# Patient Record
Sex: MALE | Race: WHITE | NOT HISPANIC OR LATINO | Employment: FULL TIME | URBAN - METROPOLITAN AREA
[De-identification: names, ages, dates, MRNs, and addresses within clinical notes are randomized per-mention and may not be internally consistent; named-entity substitution may affect disease eponyms.]

---

## 2017-12-07 ENCOUNTER — OFFICE VISIT (OUTPATIENT)
Dept: FAMILY MEDICINE CLINIC | Facility: CLINIC | Age: 42
End: 2017-12-07

## 2017-12-07 VITALS
WEIGHT: 154 LBS | OXYGEN SATURATION: 99 % | HEIGHT: 69 IN | BODY MASS INDEX: 22.81 KG/M2 | DIASTOLIC BLOOD PRESSURE: 82 MMHG | HEART RATE: 55 BPM | SYSTOLIC BLOOD PRESSURE: 130 MMHG

## 2017-12-07 DIAGNOSIS — F51.01 PRIMARY INSOMNIA: ICD-10-CM

## 2017-12-07 DIAGNOSIS — F41.9 ANXIETY: Primary | ICD-10-CM

## 2017-12-07 PROCEDURE — 99202 OFFICE O/P NEW SF 15 MIN: CPT | Performed by: NURSE PRACTITIONER

## 2017-12-07 RX ORDER — TRAZODONE HYDROCHLORIDE 50 MG/1
TABLET ORAL
Refills: 0 | COMMUNITY
Start: 2017-11-27

## 2017-12-07 RX ORDER — METHOCARBAMOL 500 MG/1
500 TABLET, FILM COATED ORAL
COMMUNITY
Start: 2016-03-05

## 2017-12-07 NOTE — PROGRESS NOTES
"Gilles Black is a 42 y.o. male. New pt to me that presents wanting trazodone refilled for insomnia. Pt reports periods of \"not sleeping well for many years.\"  Pt denies sleep apnea symptoms and denies a mental health history. In review of pt's chart he has a hx of alcohol abuse and he admits he drinks on the wkds. Pt has a trazodone bottle from the [facility]. He is also seeing a chiropractor for ongoing neck pain. Pt is not interested in routine healthcare.     NO CONTROLLED SUBSTANCES FROM OUR OFFICE  Assessment/Plan   Problem List Items Addressed This Visit     None      Visit Diagnoses     Anxiety    -  Primary    Relevant Orders    Ambulatory Referral to Psychiatry    Primary insomnia        Relevant Orders    Ambulatory Referral to Psychiatry             Return for Annual.  Patient Instructions   Insomnia  Insomnia is a sleep disorder that makes it difficult to fall asleep or to stay asleep. Insomnia can cause tiredness (fatigue), low energy, difficulty concentrating, mood swings, and poor performance at work or school.   There are three different ways to classify insomnia:   · Difficulty falling asleep.  · Difficulty staying asleep.  · Waking up too early in the morning.  Any type of insomnia can be long-term (chronic) or short-term (acute). Both are common. Short-term insomnia usually lasts for three months or less. Chronic insomnia occurs at least three times a week for longer than three months.  CAUSES   Insomnia may be caused by another condition, situation, or substance, such as:  · Anxiety.  · Certain medicines.  · Gastroesophageal reflux disease (GERD) or other gastrointestinal conditions.  · Asthma or other breathing conditions.  · Restless legs syndrome, sleep apnea, or other sleep disorders.  · Chronic pain.  · Menopause. This may include hot flashes.  · Stroke.  · Abuse of alcohol, tobacco, or illegal drugs.  · Depression.  · Caffeine.    · Neurological disorders, such as Alzheimer disease.  · An " overactive thyroid (hyperthyroidism).  The cause of insomnia may not be known.  RISK FACTORS  Risk factors for insomnia include:  · Gender. Women are more commonly affected than men.  · Age. Insomnia is more common as you get older.  · Stress. This may involve your professional or personal life.  · Income. Insomnia is more common in people with lower income.  · Lack of exercise.    · Irregular work schedule or night shifts.  · Traveling between different time zones.  SIGNS AND SYMPTOMS  If you have insomnia, trouble falling asleep or trouble staying asleep is the main symptom. This may lead to other symptoms, such as:  · Feeling fatigued.  · Feeling nervous about going to sleep.  · Not feeling rested in the morning.  · Having trouble concentrating.  · Feeling irritable, anxious, or depressed.  TREATMENT   Treatment for insomnia depends on the cause. If your insomnia is caused by an underlying condition, treatment will focus on addressing the condition. Treatment may also include:   · Medicines to help you sleep.  · Counseling or therapy.  · Lifestyle adjustments.  HOME CARE INSTRUCTIONS   · Take medicines only as directed by your health care provider.  · Keep regular sleeping and waking hours. Avoid naps.  · Keep a sleep diary to help you and your health care provider figure out what could be causing your insomnia. Include:      When you sleep.    When you wake up during the night.    How well you sleep.      How rested you feel the next day.    Any side effects of medicines you are taking.    What you eat and drink.    · Make your bedroom a comfortable place where it is easy to fall asleep:    Put up shades or special blackout curtains to block light from outside.    Use a white noise machine to block noise.    Keep the temperature cool.    · Exercise regularly as directed by your health care provider. Avoid exercising right before bedtime.  · Use relaxation techniques to manage stress. Ask your health care  provider to suggest some techniques that may work well for you. These may include:    Breathing exercises.    Routines to release muscle tension.    Visualizing peaceful scenes.  · Cut back on alcohol, caffeinated beverages, and cigarettes, especially close to bedtime. These can disrupt your sleep.  · Do not overeat or eat spicy foods right before bedtime. This can lead to digestive discomfort that can make it hard for you to sleep.  · Limit screen use before bedtime. This includes:    Watching TV.    Using your smartphone, tablet, and computer.  · Stick to a routine. This can help you fall asleep faster. Try to do a quiet activity, brush your teeth, and go to bed at the same time each night.  · Get out of bed if you are still awake after 15 minutes of trying to sleep. Keep the lights down, but try reading or doing a quiet activity. When you feel sleepy, go back to bed.  · Make sure that you drive carefully. Avoid driving if you feel very sleepy.  · Keep all follow-up appointments as directed by your health care provider. This is important.  SEEK MEDICAL CARE IF:   · You are tired throughout the day or have trouble in your daily routine due to sleepiness.  · You continue to have sleep problems or your sleep problems get worse.  SEEK IMMEDIATE MEDICAL CARE IF:   · You have serious thoughts about hurting yourself or someone else.     This information is not intended to replace advice given to you by your health care provider. Make sure you discuss any questions you have with your health care provider.     Document Released: 12/15/2001 Document Revised: 09/07/2016 Document Reviewed: 09/18/2015  [employer] Interactive Patient Education ©2017 [employer]        Chief Complaint   Patient presents with   • NP-establish care   • Insomnia     Social History   Substance Use Topics   • Smoking status: Never Smoker   • Smokeless tobacco: Never Used   • Alcohol use None       History of Present Illness     The following portions  "of the patient's history were reviewed and updated as appropriate:PMHroutine: Social history , Allergies, Current Medications, Active Problem List and Health Maintenance    Review of Systems   Constitutional: Negative for activity change, appetite change, chills, fatigue, fever and unexpected weight change.   HENT: Negative for congestion, ear pain, hearing loss, mouth sores, nosebleeds, rhinorrhea and sore throat.    Eyes: Negative for pain and visual disturbance.   Respiratory: Negative for cough, shortness of breath and wheezing.    Cardiovascular: Negative for chest pain, palpitations and leg swelling.   Gastrointestinal: Negative for abdominal distention, abdominal pain, blood in stool, constipation, diarrhea, nausea and vomiting.   Endocrine: Negative for cold intolerance and heat intolerance.   Genitourinary: Negative for difficulty urinating, discharge, dysuria, frequency, hematuria and urgency.   Musculoskeletal: Positive for back pain, myalgias and neck pain. Negative for joint swelling.   Skin: Negative for rash and wound.   Neurological: Negative for dizziness, weakness, numbness and headaches.   Hematological: Does not bruise/bleed easily.   Psychiatric/Behavioral: Positive for agitation. Negative for confusion, dysphoric mood, sleep disturbance and suicidal ideas. The patient is nervous/anxious.        Objective   Vitals:    12/07/17 1304   BP: 130/82   Pulse: 55   SpO2: 99%   Weight: 69.9 kg (154 lb)   Height: 175.3 cm (69.02\")     Body mass index is 22.73 kg/(m^2).  Physical Exam   Constitutional: He appears well-developed and well-nourished.   HENT:   Head: Normocephalic.   Eyes: EOM are normal. Pupils are equal, round, and reactive to light.   Neck: Normal range of motion.   Pulmonary/Chest: Effort normal.   Nursing note and vitals reviewed.    Reviewed Data:  No results found for any previous visit.      "

## 2023-04-03 RX ORDER — ONDANSETRON HYDROCHLORIDE 8 MG/1
TABLET, FILM COATED ORAL
Qty: 15 TABLET | Refills: 0 | Status: SHIPPED | OUTPATIENT
Start: 2023-04-03

## 2023-05-30 ENCOUNTER — OFFICE VISIT (OUTPATIENT)
Dept: FAMILY MEDICINE CLINIC | Facility: CLINIC | Age: 48
End: 2023-05-30

## 2023-05-30 VITALS
HEART RATE: 53 BPM | BODY MASS INDEX: 24.34 KG/M2 | SYSTOLIC BLOOD PRESSURE: 118 MMHG | HEIGHT: 70 IN | DIASTOLIC BLOOD PRESSURE: 77 MMHG | RESPIRATION RATE: 16 BRPM | TEMPERATURE: 98.3 F | OXYGEN SATURATION: 97 % | WEIGHT: 170 LBS

## 2023-05-30 DIAGNOSIS — M79.2 RADICULAR PAIN IN RIGHT ARM: Primary | ICD-10-CM

## 2023-05-30 DIAGNOSIS — Z11.59 ENCOUNTER FOR HEPATITIS C SCREENING TEST FOR LOW RISK PATIENT: ICD-10-CM

## 2023-05-30 DIAGNOSIS — R53.83 FATIGUE, UNSPECIFIED TYPE: ICD-10-CM

## 2023-05-30 RX ORDER — PAROXETINE HYDROCHLORIDE 40 MG/1
40 TABLET, FILM COATED ORAL EVERY MORNING
COMMUNITY

## 2023-05-30 RX ORDER — DEXTROAMPHETAMINE SACCHARATE, AMPHETAMINE ASPARTATE, DEXTROAMPHETAMINE SULFATE AND AMPHETAMINE SULFATE 1.25; 1.25; 1.25; 1.25 MG/1; MG/1; MG/1; MG/1
5 TABLET ORAL DAILY
COMMUNITY

## 2023-05-30 NOTE — PATIENT INSTRUCTIONS
We are working on an MRI of the cervical spine.  We may have to try PT or medication first.    We did a testosterone level by request.
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
## 2023-05-31 LAB
HCV IGG SERPL QL IA: NON REACTIVE
TESTOST SERPL-MCNC: 636 NG/DL (ref 264–916)

## 2023-07-28 ENCOUNTER — OFFICE VISIT (OUTPATIENT)
Dept: NEUROSURGERY | Facility: CLINIC | Age: 48
End: 2023-07-28
Payer: MEDICAID

## 2023-07-28 VITALS
OXYGEN SATURATION: 97 % | SYSTOLIC BLOOD PRESSURE: 110 MMHG | WEIGHT: 170 LBS | HEIGHT: 70 IN | TEMPERATURE: 97.3 F | DIASTOLIC BLOOD PRESSURE: 77 MMHG | BODY MASS INDEX: 24.34 KG/M2 | HEART RATE: 57 BPM

## 2023-07-28 DIAGNOSIS — M47.812 CERVICAL SPONDYLOSIS WITHOUT MYELOPATHY: Primary | ICD-10-CM

## 2023-07-28 DIAGNOSIS — M54.12 CERVICAL RADICULOPATHY: ICD-10-CM

## 2023-07-28 PROCEDURE — 1160F RVW MEDS BY RX/DR IN RCRD: CPT | Performed by: NEUROLOGICAL SURGERY

## 2023-07-28 PROCEDURE — 1159F MED LIST DOCD IN RCRD: CPT | Performed by: NEUROLOGICAL SURGERY

## 2023-07-28 PROCEDURE — 99204 OFFICE O/P NEW MOD 45 MIN: CPT | Performed by: NEUROLOGICAL SURGERY

## 2023-07-28 RX ORDER — PROPRANOLOL HYDROCHLORIDE 20 MG/1
TABLET ORAL
COMMUNITY
Start: 2023-06-22

## 2023-08-08 ENCOUNTER — OFFICE VISIT (OUTPATIENT)
Dept: PAIN MEDICINE | Facility: CLINIC | Age: 48
End: 2023-08-08
Payer: MEDICAID

## 2023-08-08 VITALS
DIASTOLIC BLOOD PRESSURE: 82 MMHG | OXYGEN SATURATION: 97 % | SYSTOLIC BLOOD PRESSURE: 122 MMHG | HEIGHT: 69 IN | TEMPERATURE: 97.3 F | BODY MASS INDEX: 24.26 KG/M2 | WEIGHT: 163.8 LBS | HEART RATE: 57 BPM

## 2023-08-08 DIAGNOSIS — M48.02 NEURAL FORAMINAL STENOSIS OF CERVICAL SPINE: ICD-10-CM

## 2023-08-08 DIAGNOSIS — M54.12 CERVICAL RADICULOPATHY: Primary | ICD-10-CM

## 2023-08-08 PROCEDURE — 99204 OFFICE O/P NEW MOD 45 MIN: CPT | Performed by: NURSE PRACTITIONER

## 2023-08-08 PROCEDURE — 1160F RVW MEDS BY RX/DR IN RCRD: CPT | Performed by: NURSE PRACTITIONER

## 2023-08-08 PROCEDURE — 1159F MED LIST DOCD IN RCRD: CPT | Performed by: NURSE PRACTITIONER

## 2023-08-08 PROCEDURE — 1125F AMNT PAIN NOTED PAIN PRSNT: CPT | Performed by: NURSE PRACTITIONER

## 2023-08-08 NOTE — PROGRESS NOTES
CHIEF COMPLAINT  Patient was referred by dr. Laura Barrios for neck pain that radiates down the right arm. He describes the pain as tingling and burning in his right arm to his his fingers. It does occur in his left arm but the right arm is worse. He has not had PT for this issue. He does have a chiropractor but he will not treat this issue until after he sees us. He is not taking anything for pain. Certain positions makes the pain come on.     Subjective   Gilles Black is a 48 y.o. male.   He presents to the office for evaluation of neck pain. He was referred here by Dr. Barrios.    Mr. Black's right arm pain started ~1 year ago without any inciting event.     Today his pain is 1/10VAS in severity. He does report neck pain but his primary complaint is intermittent tingling and burning pain in his right arm that radiates from the shoulder down the lateral aspect of his arm into his thumb, index, and middle finger. His pain is worsened by positioning of his neck, particularly with extension of the neck and working on the computer/using a mouse. His pain is improved by nothing in particular.     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 He lives alone. He does vape, states that 1 vape pen lasts a few weeks. He denies any etoh use. He does utilize marijuana ~ 5 times a week. He denies any other illicit substances. He does report family history of neck/back issues in his parents. He reports family history of drug and alcohol addition.     Past pain medications: None     Current pain medications: None     Past therapies:  Physical Therapy: No, he is scheduled to start PT on 8/22/2023.   Chiropractor: Yes, has not treated this issue  Massage Therapy: No  TENS: No  Neck or back surgery: No  Past pain management: No     Previous Injections:   None    Neck Pain   This is a chronic problem. The current episode started more than 1 year ago. The problem occurs constantly. The problem  has been unchanged. The pain is present in the right side. The quality of the pain is described as burning. Associated symptoms include weakness (right arm). Pertinent negatives include no chest pain, fever, headaches or numbness.      PEG Assessment   What number best describes your pain on average in the past week?2  What number best describes how, during the past week, pain has interfered with your enjoyment of life?1  What number best describes how, during the past week, pain has interfered with your general activity?  5      Current Outpatient Medications:     amphetamine-dextroamphetamine (ADDERALL) 5 MG tablet, Take 1 tablet by mouth Daily., Disp: , Rfl:     PARoxetine (PAXIL) 40 MG tablet, Take 1 tablet by mouth Every Morning., Disp: , Rfl:     propranolol (INDERAL) 20 MG tablet, , Disp: , Rfl:     The following portions of the patient's history were reviewed and updated as appropriate: allergies, current medications, past family history, past medical history, past social history, past surgical history, and problem list.    REVIEW OF PERTINENT MEDICAL DATA          Lab results:  4/18/2023:  Platelets-244  Hemoglobin A1c-5.4  4/12/2023:  Creatinine-1.02    Review of Systems   Constitutional:  Positive for activity change (decreased). Negative for chills, fatigue and fever.   HENT:  Negative for congestion.    Eyes:  Negative for visual disturbance.   Respiratory:  Negative for chest tightness and shortness of breath.    Cardiovascular:  Negative for chest pain.   Gastrointestinal:  Negative for abdominal pain, constipation and diarrhea.   Genitourinary:  Negative for difficulty urinating and dysuria.   Musculoskeletal:  Positive for neck pain.   Neurological:  Positive for weakness (right arm). Negative for dizziness, light-headedness, numbness and headaches.   Psychiatric/Behavioral:  Positive for sleep disturbance. Negative for agitation, self-injury and suicidal ideas. The patient is not nervous/anxious.   "    --  The aforementioned information the Chief Complaint section and above subjective data including any HPI data, and also the Review of Systems data, has been personally reviewed and affirmed.  --    Vitals:    08/08/23 1521   BP: 122/82   Pulse: 57   Temp: 97.3 øF (36.3 øC)   SpO2: 97%   Weight: 74.3 kg (163 lb 12.8 oz)   Height: 175.3 cm (69\")   PainSc:   1   PainLoc: Arm  Comment: right     Objective     Physical Exam  Vitals and nursing note reviewed.   Constitutional:       Appearance: Normal appearance. He is well-developed.   Eyes:      General: Lids are normal.   Cardiovascular:      Rate and Rhythm: Normal rate.   Pulmonary:      Effort: Pulmonary effort is normal.   Musculoskeletal:      Cervical back: No tenderness or bony tenderness. Pain with movement present. Decreased range of motion.      Comments: -Matt Spurling   Neurological:      Mental Status: He is alert and oriented to person, place, and time.      Motor: No weakness.   Psychiatric:         Attention and Perception: Attention normal.         Mood and Affect: Mood normal.         Speech: Speech normal.         Behavior: Behavior normal.         Judgment: Judgment normal.       Assessment & Plan   Diagnoses and all orders for this visit:    1. Cervical radiculopathy (Primary)    2. Neural foraminal stenosis of cervical spine      --- I spent 45 minutes caring for Gilles on this date of service. This time includes time spent by me in the following activities: preparing for the visit, reviewing tests, obtaining and/or reviewing a separately obtained history, performing a medically appropriate examination and/or evaluation, counseling and educating the patient/family/caregiver, referring and communicating with other health care professionals, and documenting information in the medical record     --- Gilles Black reports a pain score of 1.  Given his pain assessment as noted, treatment options were discussed and the following options " were decided upon as a follow-up plan to address the patient's pain: steroid injections.    --- Proceed with PT. Reviewed that with his low pain level there is a possibility that his pain will resolve with PT alone.     --- If no improvement with PT he will notify our office and will plan to proceed with a LYDIA at C7-T1 at that time to be performed by Dr. Corcoran.   Reviewed the procedure at length with the patient.  Included in the review was expectations, complications, risk and benefits.The procedure was described in detail and the risks, benefits and alternatives were discussed with the patient (including but not limited to: bleeding, infection, nerve damage, worsening of pain, inability to perform injection, paralysis, seizures, coma, no pain relief and death) who agreed to proceed.  Discussed the potential for sedation if warranted/wanted.  The procedure will plan to be performed at [facility] with fluoroscopic guidance(unless ultrasound is indicated) and could potentially have steroids and contrast dye used. Questions were answered and in a way the patient could understand.  Patient verbalized understanding and wishes to proceed.  This intervention will be ordered.  Discussed with patient that all procedures are part of a multimodal plan of care and include either formal PT or a home exercise program.  Patient has no evidence of coagulopathy or current infection.    --- Follow-up after procedure     NICO REPORT    As the clinician, I personally reviewed the NICO from 8/8/2023 while the patient was in the office today.    Dictated utilizing Dragon dictation.

## 2023-08-09 RX ORDER — ONDANSETRON 8 MG/1
8 TABLET, ORALLY DISINTEGRATING ORAL EVERY 8 HOURS PRN
Qty: 15 TABLET | Refills: 0 | Status: SHIPPED | OUTPATIENT
Start: 2023-08-09

## 2023-08-09 NOTE — TELEPHONE ENCOUNTER
Rx Refill Note  Requested Prescriptions     Pending Prescriptions Disp Refills    ondansetron ODT (ZOFRAN-ODT) 8 MG disintegrating tablet       Sig: Place 1 tablet on the tongue Every 8 (Eight) Hours As Needed for Nausea or Vomiting.      Last office visit with prescribing clinician: 5/30/2023   Next office visit with prescribing clinician: Visit date not found     Stephen Santoro CMA  08/09/23, 08:39 EDT

## 2023-08-22 ENCOUNTER — HOSPITAL ENCOUNTER (OUTPATIENT)
Dept: PHYSICAL THERAPY | Facility: HOSPITAL | Age: 48
Setting detail: THERAPIES SERIES
Discharge: HOME | End: 2023-08-22
Payer: MEDICAID

## 2023-08-22 DIAGNOSIS — M54.12 CERVICAL RADICULOPATHY: Primary | ICD-10-CM

## 2023-08-22 DIAGNOSIS — M54.2 NECK PAIN: ICD-10-CM

## 2023-08-22 DIAGNOSIS — M47.812 CERVICAL SPONDYLOSIS WITHOUT MYELOPATHY: ICD-10-CM

## 2023-08-22 PROCEDURE — 97161 PT EVAL LOW COMPLEX 20 MIN: CPT
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
## 2023-09-12 ENCOUNTER — APPOINTMENT (OUTPATIENT)
Dept: PHYSICAL THERAPY | Facility: HOSPITAL | Age: 48
End: 2023-09-12
Payer: MEDICAID

## 2023-09-15 ENCOUNTER — APPOINTMENT (OUTPATIENT)
Dept: PHYSICAL THERAPY | Facility: HOSPITAL | Age: 48
End: 2023-09-15
Payer: MEDICAID

## 2023-09-18 RX ORDER — ONDANSETRON 8 MG/1
TABLET, ORALLY DISINTEGRATING ORAL
Qty: 15 TABLET | Refills: 0 | Status: SHIPPED | OUTPATIENT
Start: 2023-09-18 | End: 2023-09-21

## 2023-09-19 ENCOUNTER — HOSPITAL ENCOUNTER (OUTPATIENT)
Dept: PHYSICAL THERAPY | Facility: HOSPITAL | Age: 48
Setting detail: THERAPIES SERIES
End: 2023-09-19
Payer: MEDICAID

## 2023-09-21 RX ORDER — ONDANSETRON 8 MG/1
TABLET, ORALLY DISINTEGRATING ORAL
Qty: 15 TABLET | Refills: 0 | Status: SHIPPED | OUTPATIENT
Start: 2023-09-21

## 2023-09-21 NOTE — TELEPHONE ENCOUNTER
Rx Refill Note  Requested Prescriptions     Pending Prescriptions Disp Refills    ondansetron ODT (ZOFRAN-ODT) 8 MG disintegrating tablet [Pharmacy Med Name: ONDANSETRON ODT 8 MG TABLET] 15 tablet 0     Sig: PLACE 1 TABLET BY MOUTH EVERY 8 HOURS AS NEEDED FOR NAUSEA AND/OR VOMITING      Last office visit with prescribing clinician: 5/30/2023   Last telemedicine visit with prescribing clinician: Visit date not found   Next office visit with prescribing clinician: Visit date not found       Jonny Mendosa  09/21/23, 14:27 EDT

## 2023-09-22 ENCOUNTER — HOSPITAL ENCOUNTER (OUTPATIENT)
Dept: PHYSICAL THERAPY | Facility: HOSPITAL | Age: 48
Setting detail: THERAPIES SERIES
Discharge: HOME | End: 2023-09-22
Payer: MEDICAID

## 2023-09-22 DIAGNOSIS — M54.12 CERVICAL RADICULOPATHY: Primary | ICD-10-CM

## 2023-09-22 DIAGNOSIS — M47.812 CERVICAL SPONDYLOSIS WITHOUT MYELOPATHY: ICD-10-CM

## 2023-09-22 DIAGNOSIS — M54.2 NECK PAIN: ICD-10-CM

## 2023-09-22 PROCEDURE — 97110 THERAPEUTIC EXERCISES: CPT

## 2023-09-22 PROCEDURE — 97140 MANUAL THERAPY 1/> REGIONS: CPT

## 2023-09-22 NOTE — THERAPY PROGRESS REPORT/RE-CERT
Outpatient Physical Therapy Ortho Progress Note  [facility]     Patient Name: Gilles Black  : 1975  MRN: [id number]  Today's Date: 2023      Visit Date: 2023    Visit Dx:    ICD-10-CM ICD-9-CM   1. Cervical radiculopathy  M54.12 723.4   2. Cervical spondylosis without myelopathy  M47.812 721.0   3. Neck pain  M54.2 723.1       Patient Active Problem List   Diagnosis    Back muscle spasm    Upper back strain    Radicular pain in right arm    Cervical spondylosis without myelopathy    Cervical radiculopathy    Neural foraminal stenosis of cervical spine        Past Medical History:   Diagnosis Date    Anxiety         Past Surgical History:   Procedure Laterality Date    TONSILLECTOMY      VASECTOMY                          PT Assessment/Plan       Row Name 23 1500          PT Assessment    Functional Limitations Impaired locomotion;Limitations in community activities;Performance in leisure activities;Performance in self-care ADL;Performance in work activities;Performance in sport activities  -     Impairments Sensation;Posture;Pain;Muscle strength;Motor function;Joint mobility;Joint integrity  -MANGO     Assessment Comments Gilles Black has been seen for 2 physical therapy sessions (including initial evaluation and today's progress note) for R sided cervical radiculopathy. Patient reports relatively no change in symptoms since the start of PT. He continues to have intermittent (approximately 10 episodes within a work day) of tingling to R 2-3 digits. He is also seeing chiropractor 1x/week and feels spinal manipulations provide short lasting relief. He has noticed following cervical manual traction/decompressions, symptoms will remain improved for nearly 24 hours.  Treatment has included therapeutic exercise, manual therapy, and patient education with home exercise program . Progress to physical therapy goals is slow due to completion of limited sessions secondary to clinic  availability and patient cancellations. Pt has met 0/3 STG and 1/4 LTG and slowly progressing toward remaining goals. Patient continues to demo poor sitting posture with FH and rounded shoulders leading to increased RUE radicular symptoms. Focused on cervical stability, dorsal scapular strength, anterior chest stretching and median nerve mobility with good tolerance. His QuickDASH score improved from 34% disability to 20% disability, where 0% represents no disability. Provided updated print out of appts and HEP. Reviewed changes in detail. He will benefit from continued skilled physical therapy to address remaining impairments and functional limitations.  -MANGO     Please refer to paper survey for additional self-reported information Yes  -MANGO     Rehab Potential Good  -MANGO     Patient/caregiver participated in establishment of treatment plan and goals Yes  -MANGO     Patient would benefit from skilled therapy intervention Yes  -MANGO        PT Plan    PT Frequency 2x/week  -MANGO     Predicted Duration of Therapy Intervention (PT) 6-8 visits  -MANGO     Planned CPT's? PT RE-EVAL: 08585;PT THER PROC EA 15 MIN: 79721;PT THER ACT EA 15 MIN: 26342;PT MANUAL THERAPY EA 15 MIN: 33304;PT NEUROMUSC RE-EDUCATION EA 15 MIN: 46526;PT SELF CARE/HOME MGMT/TRAIN EA 15: 34241;PT HOT OR COLD PACK TREAT MCARE;PT ELECTRICAL STIM UNATTEND: ;PT TRACTION CERVICAL: 04781  -MANGO     Physical Therapy Interventions (Optional Details) dry needling  -MANGO     PT Plan Comments did patient bring picture of desk set up, do to limited response to manual traction - if symptoms come on go into traction and see if relief. consider breaking down form with rows/ext/lat pull, consider qped chin tuck, prone I,Y,T  -MANGO               User Key  (r) = Recorded By, (t) = Taken By, (c) = Cosigned By      Initials Name Provider Type    Lisa Bustillo, PT Physical Therapist                       OP Exercises       Row Name 09/22/23 1500             Subjective     Subjective Comments The chiropractor seems to be helping for a short period of time. I do like it when he decompresses my spine by pulling on my head. I am aware of my posture and need to imprve my desk set up  -MANGO         Subjective Pain    Able to rate subjective pain? yes  -MANGO      Pre-Treatment Pain Level 3  -MANGO         Total Minutes    64427 - PT Therapeutic Exercise Minutes 30  -MANGO      77017 - PT Manual Therapy Minutes 10  -MANGO         Exercise 1    Exercise Name 1 UBE  -MANGO         Exercise 2    Exercise Name 2 supine chin tucks  -MANGO      Cueing 2 Verbal;Demo  -MANGO      Sets 2 2  -MANGO      Reps 2 10  -MANGO      Time 2 5sec  -MANGO         Exercise 3    Exercise Name 3 supine chin tuck with rotation  -MANGO      Cueing 3 Verbal;Demo  -MANGO      Sets 3 2  -MANGO      Reps 3 10  -MANGO         Exercise 4    Exercise Name 4 scap retraction  -MANGO      Cueing 4 Verbal;Demo  -MANGO      Sets 4 2  -MANGO      Reps 4 10  -MANGO         Exercise 5    Exercise Name 5 door way stretch  -MANGO      Cueing 5 Verbal;Demo  -MANGO      Reps 5 3  -MANGO      Time 5 20sec  -MANGO         Exercise 6    Exercise Name 6 sitting chin tucks  -MANGO      Cueing 6 Verbal;Demo  -MANGO      Sets 6 1  -MANGO      Reps 6 10  -MANGO      Time 6 5s  -MANGO      Additional Comments inc nerve symptoms  -MANGO         Exercise 7    Exercise Name 7 median nerve glide  -MANGO      Cueing 7 Verbal;Demo  -MANGO      Sets 7 2  -MANGO      Reps 7 10  -MANGO         Exercise 8    Exercise Name 8 SL thoracic rotation  -MANGO      Cueing 8 Verbal;Tactile  -MANGO      Sets 8 1  -MANGO      Reps 8 10  -MANGO         Exercise 9    Exercise Name 9 Time spent filling out survey, discussing goals/POC and HEP  -MANGO      Time 9 15 mins  -MANGO                User Key  (r) = Recorded By, (t) = Taken By, (c) = Cosigned By      Initials Name Provider Type    Lisa Bustillo, PT Physical Therapist                             Manual Rx (last 36 hours)       Manual Treatments       Row Name 09/22/23 1500             Total Minutes    92250 -  PT Manual Therapy Minutes 10  -MANGO         Manual Rx 1    Manual Rx 1 Location suboccipital release  -         Manual Rx 2    Manual Rx 2 Location cervical traction/distraction  -         Manual Rx 3    Manual Rx 3 Location R UT/LS STM  -                User Key  (r) = Recorded By, (t) = Taken By, (c) = Cosigned By      Initials Name Provider Type    Lisa Bustillo, PT Physical Therapist                     PT OP Goals       Row Name 09/22/23 1500          PT Short Term Goals    STG Date to Achieve 09/21/23  -     STG 1 Pt will be independent with initial HEP and postural awareness to improve pain and symptoms.  -     STG 1 Progress Ongoing  -     STG 2 Pt will report >50% of improvement in focus while participating in job requirements to demonstrate improved symptoms.  -     STG 2 Progress Ongoing  -     STG 2 Progress Comments no change since eval  -     STG 3 Pt will report <2 instances of onset of N/T during one work day to report decrease in onset of symptoms.  -     STG 3 Progress Ongoing  -     STG 3 Progress Comments 10x  -        Long Term Goals    LTG Date to Achieve 10/21/23  -     LTG 1 Pt will be independent with advance HEP and postural awareness for continued management of pain and symptoms.  -     LTG 1 Progress Ongoing  -     LTG 2 Pt will improve QuickDASH perceived disability from 34/100% to 20% to improve overall quality of life.  -     LTG 2 Progress Met  -     LTG 2 Progress Comments 20%  -     LTG 3 Pt will report ability to sit at desk for > 1 hour with </= 2/10 neck pain to improve job participation.  -     LTG 3 Progress Ongoing  -     LTG 4 Pt will demonstrate cervical ROM WNL to increase ability to participate in activities.  -     LTG 4 Progress Ongoing  -               User Key  (r) = Recorded By, (t) = Taken By, (c) = Cosigned By      Initials Name Provider Type    Lisa Bustillo, PT Physical Therapist                          Outcome Measure Options: Quick DASH  Quick DASH  Open a tight or new jar.: No Difficulty  Do heavy household chores (e.g., wash walls, wash floors): No Difficulty  Carry a shopping bag or briefcase: No Difficulty  Wash your back: Mild Difficulty  Use a knife to cut food: No Difficulty  Recreational activities in which you take some force or impact through your arm, should or hand (e.g. golf, hammering, tennis, etc.): Mild Difficulty  During the past week, to what extent has your arm, shoulder, or hand problem interfered with your normal social activites with family, friends, neighbors or groups?: Not at all  During the past week, were you limited in your work or other regular daily activities as a result of your arm, shoulder or hand problem?: Moderately Limited  Arm, Shoulder, or hand pain: Mild  Tingling (pins and needles) in your arm, shoulder, or hand: Moderate  During the past week, how much difficulty have you had sleeping because of the pain in your arm, shoulder or hand?: Moderate Difficiculty  Number of Questions Answered: 11  Quick DASH Score: 20.45         Time Calculation:   Start Time: 1535  Stop Time: 1615  Time Calculation (min): 40 min  Timed Charges  69929 - PT Therapeutic Exercise Minutes: 30  58315 - PT Manual Therapy Minutes: 10  Total Minutes  Timed Charges Total Minutes: 40   Total Minutes: 40  Therapy Charges for Today       Code Description Service Date Service Provider Modifiers Qty    64267619389  PT THER PROC EA 15 MIN 9/22/2023 Lisa Schmidt, PT GP 2    62428930065 HC PT MANUAL THERAPY EA 15 MIN 9/22/2023 Lisa Schmidt, PT GP 1            PT G-Codes  Outcome Measure Options: Quick DASH  Quick DASH Score: 20.45         Lisa Schmidt, PT  9/22/2023

## 2023-09-26 ENCOUNTER — APPOINTMENT (OUTPATIENT)
Dept: PHYSICAL THERAPY | Facility: HOSPITAL | Age: 48
End: 2023-09-26
Payer: MEDICAID

## 2023-09-29 ENCOUNTER — APPOINTMENT (OUTPATIENT)
Dept: PHYSICAL THERAPY | Facility: HOSPITAL | Age: 48
End: 2023-09-29
Payer: MEDICAID

## 2024-01-02 RX ORDER — DICYCLOMINE HYDROCHLORIDE 10 MG/1
CAPSULE ORAL
Qty: 60 CAPSULE | Refills: 0 | Status: SHIPPED | OUTPATIENT
Start: 2024-01-02

## 2024-01-22 PROBLEM — F41.8 ANXIETY ASSOCIATED WITH DEPRESSION: Status: ACTIVE | Noted: 2020-02-21

## 2024-01-22 PROBLEM — K57.32 SIGMOID DIVERTICULITIS: Status: ACTIVE | Noted: 2020-07-26

## 2024-01-22 PROBLEM — F10.11 HISTORY OF ALCOHOL ABUSE: Chronic | Status: ACTIVE | Noted: 2020-04-20

## 2024-01-23 ENCOUNTER — OFFICE VISIT (OUTPATIENT)
Dept: FAMILY MEDICINE CLINIC | Facility: CLINIC | Age: 49
End: 2024-01-23
Payer: MEDICAID

## 2024-01-23 VITALS
SYSTOLIC BLOOD PRESSURE: 122 MMHG | DIASTOLIC BLOOD PRESSURE: 78 MMHG | HEART RATE: 78 BPM | BODY MASS INDEX: 25.18 KG/M2 | HEIGHT: 69 IN | WEIGHT: 170 LBS

## 2024-01-23 DIAGNOSIS — K58.8 OTHER IRRITABLE BOWEL SYNDROME: ICD-10-CM

## 2024-01-23 DIAGNOSIS — M79.2 RADICULAR PAIN IN RIGHT ARM: ICD-10-CM

## 2024-01-23 DIAGNOSIS — Z00.00 ANNUAL PHYSICAL EXAM: Primary | ICD-10-CM

## 2024-01-23 DIAGNOSIS — F41.8 ANXIETY ASSOCIATED WITH DEPRESSION: ICD-10-CM

## 2024-01-23 DIAGNOSIS — Z72.51 HISTORY OF UNPROTECTED SEX: ICD-10-CM

## 2024-01-23 DIAGNOSIS — Z13.220 NEED FOR LIPID SCREENING: ICD-10-CM

## 2024-01-23 PROBLEM — K57.30 SIGMOID DIVERTICULOSIS: Status: ACTIVE | Noted: 2020-07-26

## 2024-01-23 PROCEDURE — 1160F RVW MEDS BY RX/DR IN RCRD: CPT | Performed by: FAMILY MEDICINE

## 2024-01-23 PROCEDURE — 1159F MED LIST DOCD IN RCRD: CPT | Performed by: FAMILY MEDICINE

## 2024-01-23 PROCEDURE — 99396 PREV VISIT EST AGE 40-64: CPT | Performed by: FAMILY MEDICINE

## 2024-01-23 RX ORDER — BREXPIPRAZOLE 0.5 MG/1
TABLET ORAL
COMMUNITY
Start: 2023-12-14

## 2024-01-23 RX ORDER — ONDANSETRON 8 MG/1
8 TABLET, ORALLY DISINTEGRATING ORAL EVERY 8 HOURS PRN
Qty: 15 TABLET | Refills: 2 | Status: SHIPPED | OUTPATIENT
Start: 2024-01-23

## 2024-01-23 RX ORDER — DICYCLOMINE HYDROCHLORIDE 10 MG/1
20 CAPSULE ORAL 4 TIMES DAILY PRN
Qty: 60 CAPSULE | Refills: 2 | Status: SHIPPED | OUTPATIENT
Start: 2024-01-23

## 2024-01-23 NOTE — PATIENT INSTRUCTIONS
I have ordered lab tests today.  You should receive a phone call or a [employer] message with those results.  If you have not heard from us in 7-10 days, please call the office.  (Fasting)    Keep up the regular exercise.  Try to avoid alcohol.  You've done a great job keeping up with routine screenings.

## 2024-01-23 NOTE — PROGRESS NOTES
"Chief Complaint  Annual Exam    Subjective    {CC  Problem List  Visit  Diagnosis   Encounters  Notes  Medications  Labs  Result Review Imaging  Media :23}     Gilles Black presents to [facility] [place] for Annual Exam.    History of Present Illness     Annual Exam     Colonoscopy: 9/2020  PSA: not indicated  Vaccines: UTD  Tobacco: never  Alcohol: some and limits to 2-3 if he drinks.  Exercise: daily walking and strength training in the gym.  Intends to increase in the spring.   Sunscreen: yes and saw Dr. Carrington recently and had treatment with 5FU for a skin precancer.    Hepatitis C screening: yes     Former alcohol abuse and now limits with mindful drinking and does have a target of 0.   H/O diverticulitis and also IBS that responds to dicyclomine.    He does use condoms mostly and does not feel a need for testing but did donate blood recently and feels that's reassuring. .      Review of Systems     Objective       Vital Signs:   /78   Pulse 78   Ht 175.3 cm (69.02\")   Wt 77.1 kg (170 lb)   BMI 25.09 kg/m²     Body mass index is 25.09 kg/m².       PHQ-9 Total Score: 0     Physical Exam  Constitutional:       General: He is not in acute distress.     Appearance: Normal appearance.   HENT:      Head: Normocephalic and atraumatic.      Nose: Nose normal.   Eyes:      Conjunctiva/sclera: Conjunctivae normal.      Pupils: Pupils are equal, round, and reactive to light.   Cardiovascular:      Rate and Rhythm: Normal rate and regular rhythm.      Heart sounds: Normal heart sounds.   Pulmonary:      Effort: Pulmonary effort is normal.      Breath sounds: Normal breath sounds.   Abdominal:      General: Bowel sounds are normal.      Palpations: Abdomen is soft.   Musculoskeletal:      Cervical back: Neck supple.   Skin:     General: Skin is warm.   Neurological:      General: No focal deficit present.      Mental Status: He is alert.      Gait: Gait normal.   Psychiatric:         " Behavior: Behavior normal.          Result Review  Data Reviewed:{ Labs  Result Review  Imaging  Med Tab  Media :23}               Discussed healthy diet, exercise, adequate sleep, cancer screening, immunizations and preventative care. Annual eye exam and routine dental cleaning encouraged.        Assessment and Plan {CC Problem List  Visit Diagnosis  ROS  Review (Popup)  Health Maintenance  Quality  BestPractice  Medications  SmartSets  SnapShot Encounters  Media :23}   Diagnoses and all orders for this visit:    1. Annual physical exam (Primary)    2. Other irritable bowel syndrome  Assessment & Plan:  That presents with cramping and responds to dicyclomine.     Orders:  -     dicyclomine (BENTYL) 10 MG capsule; Take 2 capsules by mouth 4 (Four) Times a Day As Needed for Abdominal Cramping.  Dispense: 60 capsule; Refill: 2  -     ondansetron ODT (ZOFRAN-ODT) 8 MG disintegrating tablet; Place 1 tablet on the tongue Every 8 (Eight) Hours As Needed for Nausea or Vomiting.  Dispense: 15 tablet; Refill: 2    3. Radicular pain in right arm  Assessment & Plan:  Intermittent and manageable.       4. Need for lipid screening  -     Comprehensive Metabolic Panel  -     Lipid Panel    5. History of unprotected sex  -     Chlamydia trachomatis, Neisseria gonorrhoeae, PCR - , Urine, Clean Catch  -     RPR    6. Anxiety associated with depression  Assessment & Plan:  On Paxil and Jeffery through [facility] at [facility]          Patient Instructions   I have ordered lab tests today.  You should receive a phone call or a [employer] message with those results.  If you have not heard from us in 7-10 days, please call the office.  (Fasting)    Keep up the regular exercise.  Try to avoid alcohol.  You've done a great job keeping up with routine screenings.        No follow-ups on file.    Laura Barrios MD

## 2024-01-24 LAB
ALBUMIN SERPL-MCNC: 4.5 G/DL (ref 3.5–5.2)
ALBUMIN/GLOB SERPL: 2 G/DL
ALP SERPL-CCNC: 71 U/L (ref 39–117)
ALT SERPL-CCNC: 15 U/L (ref 1–41)
AST SERPL-CCNC: 21 U/L (ref 1–40)
BILIRUB SERPL-MCNC: 0.6 MG/DL (ref 0–1.2)
BUN SERPL-MCNC: 8 MG/DL (ref 6–20)
BUN/CREAT SERPL: 8.9 (ref 7–25)
C TRACH RRNA SPEC QL NAA+PROBE: NEGATIVE
CALCIUM SERPL-MCNC: 9.6 MG/DL (ref 8.6–10.5)
CHLORIDE SERPL-SCNC: 102 MMOL/L (ref 98–107)
CHOLEST SERPL-MCNC: 172 MG/DL (ref 0–200)
CO2 SERPL-SCNC: 26.3 MMOL/L (ref 22–29)
CREAT SERPL-MCNC: 0.9 MG/DL (ref 0.76–1.27)
EGFRCR SERPLBLD CKD-EPI 2021: 105.4 ML/MIN/1.73
GLOBULIN SER CALC-MCNC: 2.3 GM/DL
GLUCOSE SERPL-MCNC: 111 MG/DL (ref 65–99)
HDLC SERPL-MCNC: 68 MG/DL (ref 40–60)
LDLC SERPL CALC-MCNC: 92 MG/DL (ref 0–100)
N GONORRHOEA RRNA SPEC QL NAA+PROBE: NEGATIVE
POTASSIUM SERPL-SCNC: 4.6 MMOL/L (ref 3.5–5.2)
PROT SERPL-MCNC: 6.8 G/DL (ref 6–8.5)
RPR SER QL: NON REACTIVE
SODIUM SERPL-SCNC: 139 MMOL/L (ref 136–145)
TRIGL SERPL-MCNC: 60 MG/DL (ref 0–150)
VLDLC SERPL CALC-MCNC: 12 MG/DL (ref 5–40)
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
## 2024-11-15 ENCOUNTER — PATIENT MESSAGE (OUTPATIENT)
Dept: FAMILY MEDICINE CLINIC | Facility: CLINIC | Age: 49
End: 2024-11-15
Payer: COMMERCIAL

## 2025-01-28 ENCOUNTER — OFFICE VISIT (OUTPATIENT)
Dept: FAMILY MEDICINE CLINIC | Facility: CLINIC | Age: 50
End: 2025-01-28
Payer: COMMERCIAL

## 2025-01-28 VITALS
HEIGHT: 69 IN | SYSTOLIC BLOOD PRESSURE: 136 MMHG | OXYGEN SATURATION: 96 % | DIASTOLIC BLOOD PRESSURE: 66 MMHG | BODY MASS INDEX: 23.4 KG/M2 | WEIGHT: 158 LBS | HEART RATE: 59 BPM

## 2025-01-28 DIAGNOSIS — Z11.3 SCREENING EXAMINATION FOR STD (SEXUALLY TRANSMITTED DISEASE): ICD-10-CM

## 2025-01-28 DIAGNOSIS — Z12.11 SCREENING FOR COLON CANCER: ICD-10-CM

## 2025-01-28 DIAGNOSIS — H93.A9 PULSATILE TINNITUS: ICD-10-CM

## 2025-01-28 DIAGNOSIS — Z00.00 ANNUAL PHYSICAL EXAM: Primary | ICD-10-CM

## 2025-01-28 DIAGNOSIS — Z13.220 NEED FOR LIPID SCREENING: ICD-10-CM

## 2025-01-28 PROCEDURE — 99396 PREV VISIT EST AGE 40-64: CPT | Performed by: FAMILY MEDICINE

## 2025-01-28 PROCEDURE — 99213 OFFICE O/P EST LOW 20 MIN: CPT | Performed by: FAMILY MEDICINE

## 2025-01-28 RX ORDER — HYDROXYZINE HYDROCHLORIDE 25 MG/1
1 TABLET, FILM COATED ORAL 3 TIMES DAILY
COMMUNITY
Start: 2024-11-14

## 2025-01-28 RX ORDER — PAROXETINE 30 MG/1
2 TABLET, FILM COATED ORAL DAILY
COMMUNITY
Start: 2025-01-08

## 2025-01-28 NOTE — PROGRESS NOTES
"Chief Complaint  Annual Exam    Subjective    {CC  Problem List  Visit  Diagnosis   Encounters  Notes  Medications  Labs  Result Review Imaging  Media :23}     Gilles Black presents to [facility] for Annual Exam.    History of Present Illness     He's noticing  a pulsation/rushing sound in his ears.  It's not all the time.  It's not bothersome but he worries about it.  He has a little dizziness at times.  He can't make it happen.     He's not as active as he used to be but does walk on the treadmill a couple times a week.    He's eating okay but not as good as in the past.    He struggles with anxiety and depression and sees Dr. Teodoro Severino for his mental health.  He's overall okay and has Paxil 60mg with help.  He'll use hydroxyzine at night.  He also takes 20mg of propranolol in the mornings and he's done well with it.     IBS has done well overall.  He's now on Qelbree for ADHD and he's having some constipation which is new.  Last colonoscopy was 9/2020 and due this year due to poor prep.  He does have a history of diverticulosis.     No smoking.  No alcohol  Vaccines are UTD.       Review of Systems     Objective       Vital Signs:   /66   Pulse 59   Ht 175.3 cm (69.02\")   Wt 71.7 kg (158 lb)   SpO2 96%   BMI 23.32 kg/m²     Body mass index is 23.32 kg/m².      PHQ-9 Total Score:      Physical Exam  Constitutional:       General: He is not in acute distress.     Appearance: Normal appearance.   HENT:      Head: Normocephalic and atraumatic.      Right Ear: Tympanic membrane normal.      Left Ear: Tympanic membrane normal.      Nose: Nose normal.   Eyes:      Conjunctiva/sclera: Conjunctivae normal.      Pupils: Pupils are equal, round, and reactive to light.   Cardiovascular:      Rate and Rhythm: Normal rate and regular rhythm.      Heart sounds: Normal heart sounds.      Comments: No carotid bruits.   Pulmonary:      Effort: Pulmonary effort is normal.      Breath " sounds: Normal breath sounds.   Abdominal:      General: Bowel sounds are normal.      Palpations: Abdomen is soft.   Musculoskeletal:      Cervical back: Neck supple.   Skin:     General: Skin is warm.   Neurological:      General: No focal deficit present.      Mental Status: He is alert.      Gait: Gait normal.   Psychiatric:         Behavior: Behavior normal.          Result Review  Data Reviewed:{ Labs  Result Review  Imaging  Med Tab  Media :23}               Discussed healthy diet, exercise, adequate sleep, cancer screening, immunizations and preventative care. Annual eye exam and routine dental cleaning encouraged.        Assessment and Plan {CC Problem List  Visit Diagnosis  ROS  Review (Popup)  [employer] Maintenance  Quality  BestPractice  Medications  SmartSets  SnapShot Encounters  Media :23}   Diagnoses and all orders for this visit:    1. Annual physical exam (Primary)    2. Pulsatile tinnitus  -     Ambulatory Referral to ENT (Otolaryngology)    3. Screening for colon cancer  -     Ambulatory Referral For Screening Colonoscopy    4. Screening examination for STD (sexually transmitted disease)  -     RPR Qualitative with Reflex to Quant  -     HIV-1 / O / 2 Ag / Antibody  -     Hepatitis Panel, Acute  -     Chlamydia trachomatis, Neisseria gonorrhoeae, PCR - Urine, Urine, Clean Catch    5. Need for lipid screening  -     Comprehensive Metabolic Panel  -     Lipid Panel      BMI is within normal parameters. No other follow-up for BMI required.       Patient Instructions   The dose of propranolol that you're taking is very low and if you decide to stop it, that is safe but you may be benefiting from it.    Aim for 150 minutes of moderate exercise in a week.     I am setting up an ENT consult.     Keep an eye on your blood pressure.     I have ordered lab tests today.  You should receive a phone call or a [employer] message with those results.  If you have not heard from us in 7-10 days, please  call the office.               No follow-ups on file.    Laura Barrios MD

## 2025-01-28 NOTE — PATIENT INSTRUCTIONS
The dose of propranolol that you're taking is very low and if you decide to stop it, that is safe but you may be benefiting from it.    Aim for 150 minutes of moderate exercise in a week.     I am setting up an ENT consult.     Keep an eye on your blood pressure.     I have ordered lab tests today.  You should receive a phone call or a [employer] message with those results.  If you have not heard from us in 7-10 days, please call the office.

## 2025-01-29 LAB
ALBUMIN SERPL-MCNC: 4.5 G/DL (ref 4.1–5.1)
ALP SERPL-CCNC: 63 IU/L (ref 44–121)
ALT SERPL-CCNC: 15 IU/L (ref 0–44)
AST SERPL-CCNC: 21 IU/L (ref 0–40)
BILIRUB SERPL-MCNC: 0.6 MG/DL (ref 0–1.2)
BUN SERPL-MCNC: 11 MG/DL (ref 6–24)
BUN/CREAT SERPL: 11 (ref 9–20)
CALCIUM SERPL-MCNC: 9.4 MG/DL (ref 8.7–10.2)
CHLORIDE SERPL-SCNC: 99 MMOL/L (ref 96–106)
CHOLEST SERPL-MCNC: 160 MG/DL (ref 100–199)
CO2 SERPL-SCNC: 24 MMOL/L (ref 20–29)
CREAT SERPL-MCNC: 0.97 MG/DL (ref 0.76–1.27)
EGFRCR SERPLBLD CKD-EPI 2021: 96 ML/MIN/1.73
GLOBULIN SER CALC-MCNC: 2 G/DL (ref 1.5–4.5)
GLUCOSE SERPL-MCNC: 109 MG/DL (ref 70–99)
HAV IGM SERPL QL IA: NEGATIVE
HBV CORE IGM SERPL QL IA: NEGATIVE
HBV SURFACE AG SERPL QL IA: NEGATIVE
HCV AB SERPL QL IA: NORMAL
HCV IGG SERPL QL IA: NON REACTIVE
HDLC SERPL-MCNC: 71 MG/DL
HIV 1+2 AB+HIV1 P24 AG SERPL QL IA: NON REACTIVE
LDLC SERPL CALC-MCNC: 76 MG/DL (ref 0–99)
POTASSIUM SERPL-SCNC: 4.7 MMOL/L (ref 3.5–5.2)
PROT SERPL-MCNC: 6.5 G/DL (ref 6–8.5)
RPR SER QL: NON REACTIVE
SODIUM SERPL-SCNC: 137 MMOL/L (ref 134–144)
TRIGL SERPL-MCNC: 64 MG/DL (ref 0–149)
VLDLC SERPL CALC-MCNC: 13 MG/DL (ref 5–40)

## 2025-01-30 LAB
C TRACH RRNA SPEC QL NAA+PROBE: NEGATIVE
N GONORRHOEA RRNA SPEC QL NAA+PROBE: NEGATIVE

## 2025-04-20 ENCOUNTER — PATIENT MESSAGE (OUTPATIENT)
Dept: FAMILY MEDICINE CLINIC | Facility: CLINIC | Age: 50
End: 2025-04-20
Payer: COMMERCIAL

## 2025-04-21 ENCOUNTER — OFFICE VISIT (OUTPATIENT)
Dept: FAMILY MEDICINE CLINIC | Facility: CLINIC | Age: 50
End: 2025-04-21
Payer: COMMERCIAL

## 2025-04-21 VITALS
DIASTOLIC BLOOD PRESSURE: 80 MMHG | HEIGHT: 69 IN | WEIGHT: 164.9 LBS | HEART RATE: 73 BPM | BODY MASS INDEX: 24.42 KG/M2 | SYSTOLIC BLOOD PRESSURE: 120 MMHG | OXYGEN SATURATION: 97 %

## 2025-04-21 DIAGNOSIS — K57.90 DIVERTICULOSIS: ICD-10-CM

## 2025-04-21 DIAGNOSIS — R10.9 LEFT SIDED ABDOMINAL PAIN: Primary | ICD-10-CM

## 2025-04-21 LAB
BILIRUB BLD-MCNC: NEGATIVE MG/DL
CLARITY, POC: CLEAR
COLOR UR: YELLOW
EXPIRATION DATE: NORMAL
GLUCOSE UR STRIP-MCNC: NEGATIVE MG/DL
KETONES UR QL: NEGATIVE
LEUKOCYTE EST, POC: NEGATIVE
Lab: NORMAL
NITRITE UR-MCNC: NEGATIVE MG/ML
PH UR: 7 [PH] (ref 5–8)
PROT UR STRIP-MCNC: NEGATIVE MG/DL
RBC # UR STRIP: NEGATIVE /UL
SP GR UR: 1.01 (ref 1–1.03)
UROBILINOGEN UR QL: NORMAL

## 2025-04-21 PROCEDURE — 81003 URINALYSIS AUTO W/O SCOPE: CPT | Performed by: FAMILY MEDICINE

## 2025-04-21 PROCEDURE — 99214 OFFICE O/P EST MOD 30 MIN: CPT | Performed by: FAMILY MEDICINE

## 2025-04-21 RX ORDER — CLONIDINE HYDROCHLORIDE 0.1 MG/1
1 TABLET ORAL 3 TIMES DAILY
COMMUNITY
Start: 2025-03-17

## 2025-04-21 RX ORDER — GABAPENTIN 100 MG/1
1 CAPSULE ORAL EVERY 12 HOURS SCHEDULED
COMMUNITY
Start: 2025-03-17

## 2025-04-21 NOTE — PATIENT INSTRUCTIONS
Take antibiotic as directed for suspected diverticulitis flare.  Singer diet, advance as tolerated.  If symptoms persist or worsen despite antibiotic, plan CT and labs.

## 2025-04-21 NOTE — PROGRESS NOTES
"Chief Complaint  Diverticulitis (Flare since Saturday)    Subjective    History of Present Illness {CC  Problem List  Visit  Diagnosis   Encounters  Notes  Medications  Labs  Result Review Imaging  Media :23}     Gilles Black presents to [facility] for Diverticulitis (Flare since Saturday).  History of Present Illness   He presents with 2 day history of lower abdominal pain. He has a history of diverticulitis 5 years ago and his symptoms feel similar to that. He denies any fever, nausea or vomiting. He had 1 episode of loose stool but none since. He has also had mild dysuria.       Objective     Vital Signs:   /80   Pulse 73   Ht 175.3 cm (69\")   Wt 74.8 kg (164 lb 14.4 oz)   SpO2 97%   BMI 24.35 kg/m²   Body mass index is 24.35 kg/m².     Physical Exam  Constitutional:       General: He is not in acute distress.  Cardiovascular:      Rate and Rhythm: Normal rate and regular rhythm.      Heart sounds: No murmur heard.  Pulmonary:      Effort: No respiratory distress.      Breath sounds: Normal breath sounds.   Abdominal:      General: There is no distension.      Palpations: Abdomen is soft.      Tenderness: There is abdominal tenderness (worse suprapubic and left side). There is no guarding or rebound.   Neurological:      General: No focal deficit present.      Mental Status: He is alert.          Result Review  Data Reviewed:{ Labs  Result Review  Imaging  Med Tab  Media :23}   UA          4/21/2025    14:23   Urinalysis   Ketones, UA Negative    Leukocytes, UA Negative                 Assessment and Plan {CC Problem List  Visit Diagnosis  ROS  Review (Popup)  [facility] Maintenance  Quality  BestPractice  Medications  SmartSets  SnapShot Encounters  Media :23}   Diagnoses and all orders for this visit:    1. Left sided abdominal pain (Primary)  -     POC Urinalysis Dipstick, Automated    2. Diverticulosis    Other orders  -     " amoxicillin-clavulanate (AUGMENTIN) 875-125 MG per tablet; Take 1 tablet by mouth Every 12 (Twelve) Hours for 10 days.  Dispense: 20 tablet; Refill: 0        Patient Instructions   Take antibiotic as directed for suspected diverticulitis flare.  [place] diet, advance as tolerated.  If symptoms persist or worsen despite antibiotic, plan CT and labs.     Patient was given instructions and counseling regarding his condition or for health maintenance advice on the AVS.       No follow-ups on file.    Ale Mukherjee MD

## 2025-08-07 DIAGNOSIS — K58.8 OTHER IRRITABLE BOWEL SYNDROME: ICD-10-CM

## 2025-08-07 RX ORDER — ONDANSETRON 8 MG/1
8 TABLET, ORALLY DISINTEGRATING ORAL EVERY 8 HOURS PRN
Qty: 30 TABLET | Refills: 1 | Status: SHIPPED | OUTPATIENT
Start: 2025-08-07

## 2025-08-07 RX ORDER — GABAPENTIN 100 MG/1
100 CAPSULE ORAL EVERY 12 HOURS SCHEDULED
OUTPATIENT
Start: 2025-08-07